# Patient Record
Sex: FEMALE | Race: WHITE | NOT HISPANIC OR LATINO | URBAN - METROPOLITAN AREA
[De-identification: names, ages, dates, MRNs, and addresses within clinical notes are randomized per-mention and may not be internally consistent; named-entity substitution may affect disease eponyms.]

---

## 2021-12-06 ENCOUNTER — PREPPED CHART (OUTPATIENT)
Dept: URBAN - METROPOLITAN AREA CLINIC 101 | Facility: CLINIC | Age: 51
End: 2021-12-06

## 2021-12-06 PROBLEM — H35.373 EPIRETINAL MEMBRANE: Noted: 2021-12-06

## 2021-12-06 PROBLEM — H26.492 PCO NOT OBSCURING VISION: Noted: 2021-12-06

## 2021-12-06 PROBLEM — H25.11 NS CATARACT: Noted: 2021-12-06

## 2021-12-06 PROBLEM — E11.3593 PROLIFERATIVE DIABETIC RETINOPATHY: Noted: 2021-12-06

## 2021-12-06 PROBLEM — H53.10 SUBJECTIVE VISUAL DISTURBANCE, UNSPECIFIED: Noted: 2021-12-06

## 2021-12-06 PROBLEM — H26.492 PCH  OBSCURING VISION: Noted: 2021-12-06

## 2021-12-06 PROBLEM — H26.492 POSTERIOR CAPSULAR OPACITY: Noted: 2021-12-06

## 2022-05-26 ASSESSMENT — TONOMETRY
OD_IOP_MMHG: 14
OS_IOP_MMHG: 16

## 2022-05-26 ASSESSMENT — VISUAL ACUITY
OS_CC: 20/80-2
OD_CC: 20/25

## 2022-06-20 ENCOUNTER — FOLLOW UP (OUTPATIENT)
Dept: URBAN - METROPOLITAN AREA CLINIC 101 | Facility: CLINIC | Age: 52
End: 2022-06-20

## 2022-06-20 DIAGNOSIS — H35.373: ICD-10-CM

## 2022-06-20 DIAGNOSIS — H26.492: ICD-10-CM

## 2022-06-20 DIAGNOSIS — H25.11: ICD-10-CM

## 2022-06-20 DIAGNOSIS — E11.3593: ICD-10-CM

## 2022-06-20 PROCEDURE — 92202 OPSCPY EXTND ON/MAC DRAW: CPT

## 2022-06-20 PROCEDURE — 92134 CPTRZ OPH DX IMG PST SGM RTA: CPT

## 2022-06-20 PROCEDURE — 92014 COMPRE OPH EXAM EST PT 1/>: CPT

## 2022-06-20 ASSESSMENT — VISUAL ACUITY
OD_CC: 20/25
OS_PH: 20/70-1
OS_CC: 20/80-1

## 2022-06-20 ASSESSMENT — TONOMETRY
OS_IOP_MMHG: 19
OD_IOP_MMHG: 20

## 2022-12-19 ENCOUNTER — 6 MONTH FOLLOW UP (OUTPATIENT)
Dept: URBAN - METROPOLITAN AREA CLINIC 101 | Facility: CLINIC | Age: 52
End: 2022-12-19

## 2022-12-19 DIAGNOSIS — H25.11: ICD-10-CM

## 2022-12-19 DIAGNOSIS — H35.373: ICD-10-CM

## 2022-12-19 DIAGNOSIS — H26.492: ICD-10-CM

## 2022-12-19 DIAGNOSIS — E11.3593: ICD-10-CM

## 2022-12-19 PROCEDURE — 92202 OPSCPY EXTND ON/MAC DRAW: CPT

## 2022-12-19 PROCEDURE — 92134 CPTRZ OPH DX IMG PST SGM RTA: CPT

## 2022-12-19 PROCEDURE — 92014 COMPRE OPH EXAM EST PT 1/>: CPT

## 2022-12-19 ASSESSMENT — TONOMETRY
OS_IOP_MMHG: 18
OD_IOP_MMHG: 13

## 2022-12-19 ASSESSMENT — VISUAL ACUITY
OD_CC: 20/25-1
OS_CC: 20/80-2
OS_PH: 20/70

## 2023-06-26 ENCOUNTER — 6 MONTH FOLLOW UP (OUTPATIENT)
Dept: URBAN - METROPOLITAN AREA CLINIC 101 | Facility: CLINIC | Age: 53
End: 2023-06-26

## 2023-06-26 DIAGNOSIS — E11.3593: ICD-10-CM

## 2023-06-26 DIAGNOSIS — H35.373: ICD-10-CM

## 2023-06-26 DIAGNOSIS — H25.11: ICD-10-CM

## 2023-06-26 DIAGNOSIS — H26.492: ICD-10-CM

## 2023-06-26 DIAGNOSIS — H35.342: ICD-10-CM

## 2023-06-26 PROCEDURE — 92202 OPSCPY EXTND ON/MAC DRAW: CPT

## 2023-06-26 PROCEDURE — 92134 CPTRZ OPH DX IMG PST SGM RTA: CPT

## 2023-06-26 PROCEDURE — 92014 COMPRE OPH EXAM EST PT 1/>: CPT

## 2023-06-26 ASSESSMENT — VISUAL ACUITY
OS_CC: 20/80-1
OD_CC: 20/25
OS_PH: 20/70-2

## 2023-06-26 ASSESSMENT — TONOMETRY
OD_IOP_MMHG: 14
OS_IOP_MMHG: 17

## 2023-09-08 ENCOUNTER — APPOINTMENT (RX ONLY)
Dept: URBAN - METROPOLITAN AREA CLINIC 151 | Facility: CLINIC | Age: 53
Setting detail: DERMATOLOGY
End: 2023-09-08

## 2023-09-08 DIAGNOSIS — D18.0 HEMANGIOMA: ICD-10-CM

## 2023-09-08 DIAGNOSIS — L82.1 OTHER SEBORRHEIC KERATOSIS: ICD-10-CM

## 2023-09-08 DIAGNOSIS — D22 MELANOCYTIC NEVI: ICD-10-CM

## 2023-09-08 DIAGNOSIS — L81.4 OTHER MELANIN HYPERPIGMENTATION: ICD-10-CM

## 2023-09-08 PROBLEM — D18.01 HEMANGIOMA OF SKIN AND SUBCUTANEOUS TISSUE: Status: ACTIVE | Noted: 2023-09-08

## 2023-09-08 PROBLEM — D23.9 OTHER BENIGN NEOPLASM OF SKIN, UNSPECIFIED: Status: ACTIVE | Noted: 2023-09-08

## 2023-09-08 PROBLEM — D22.9 MELANOCYTIC NEVI, UNSPECIFIED: Status: ACTIVE | Noted: 2023-09-08

## 2023-09-08 PROCEDURE — ? COUNSELING

## 2023-09-08 PROCEDURE — ? DIAGNOSIS COMMENT

## 2023-09-08 PROCEDURE — 99203 OFFICE O/P NEW LOW 30 MIN: CPT

## 2023-09-08 NOTE — PROCEDURE: DIAGNOSIS COMMENT
Comment: FBSE - benign findings today. Benign nevi and lentigines throughout body, reassured pt. FU one year.
Render Risk Assessment In Note?: no
Detail Level: Generalized

## 2024-03-18 ENCOUNTER — 6 MONTH FOLLOW UP (OUTPATIENT)
Dept: URBAN - METROPOLITAN AREA CLINIC 101 | Facility: CLINIC | Age: 54
End: 2024-03-18

## 2024-03-18 DIAGNOSIS — H35.342: ICD-10-CM

## 2024-03-18 DIAGNOSIS — E11.3593: ICD-10-CM

## 2024-03-18 DIAGNOSIS — H35.373: ICD-10-CM

## 2024-03-18 DIAGNOSIS — H25.11: ICD-10-CM

## 2024-03-18 DIAGNOSIS — H26.492: ICD-10-CM

## 2024-03-18 PROCEDURE — 92014 COMPRE OPH EXAM EST PT 1/>: CPT

## 2024-03-18 PROCEDURE — 92134 CPTRZ OPH DX IMG PST SGM RTA: CPT

## 2024-03-18 PROCEDURE — 92202 OPSCPY EXTND ON/MAC DRAW: CPT

## 2024-03-18 ASSESSMENT — TONOMETRY
OS_IOP_MMHG: 19
OD_IOP_MMHG: 19

## 2024-03-18 ASSESSMENT — VISUAL ACUITY
OS_CC: 20/80
OS_PH: 20/60-2
OD_CC: 20/20-2

## 2024-12-16 ENCOUNTER — FOLLOW UP (OUTPATIENT)
Dept: URBAN - METROPOLITAN AREA CLINIC 101 | Facility: CLINIC | Age: 54
End: 2024-12-16

## 2024-12-16 DIAGNOSIS — H35.373: ICD-10-CM

## 2024-12-16 DIAGNOSIS — H25.11: ICD-10-CM

## 2024-12-16 DIAGNOSIS — H35.342: ICD-10-CM

## 2024-12-16 DIAGNOSIS — E11.3593: ICD-10-CM

## 2024-12-16 DIAGNOSIS — H26.492: ICD-10-CM

## 2024-12-16 PROCEDURE — 92134 CPTRZ OPH DX IMG PST SGM RTA: CPT

## 2024-12-16 PROCEDURE — 92014 COMPRE OPH EXAM EST PT 1/>: CPT

## 2024-12-16 PROCEDURE — 92202 OPSCPY EXTND ON/MAC DRAW: CPT

## 2024-12-16 ASSESSMENT — TONOMETRY
OS_IOP_MMHG: 23
OD_IOP_MMHG: 20

## 2024-12-16 ASSESSMENT — VISUAL ACUITY
OS_CC: 20/150+2
OD_CC: 20/25-2